# Patient Record
(demographics unavailable — no encounter records)

---

## 2024-10-24 NOTE — ASSESSMENT
[FreeTextEntry1] : # SCCIS, L upper back superior - Excision 10/24/2024 with 4mm margins   Excision Operative Note   Indications:  Alternative therapies were discussed. Given the size, location, and tumor type we decided that excision offers the best option for treatment.   Preoperative diagnosis: SCCIS  Postoperative diagnosis: Same   Location: L upper back superior  Anesthetic: 1% lidocaine with 1:100,000 epinephrine   Antiseptic: Chlorhexidine or Betadine  Attending surgeon: Victor Hugo Cheng MD Assistant(s): Poonam March MD (Dermsurgical resident)  Estimated blood loss: < 5cc    Complications: None  Initial lesion size: 1cm x 1cm   Surgical margin: 0.4cm  Total excision size (always includes surgical margin):  1.8x1.8cm  Closure type: Complex linear closure  Length of closure: 4.5cm  Suture material: 3.0 Vicryl, 3.0 Ethilon    The patient was brought back to the minor surgery operating room. Prior to the procedure the medical record was reviewed by the physician. A pre-procedure checklist in the presence of the patient was reviewed. A surgery " timeout " was observed. The following were confirmed during the surgery timeout: patient name, confirmation of consent, patient position, allergies, type of anesthesia, and antibiotic given (if any, see emr entry for any medications).  The risks of the procedure, including bleeding, infection, nerve damage, possibility of recurrence, failure of the repair, and the certainty of a scar were discussed with the patient. Alternatives to the procedure, as well as their risks and benefits, were also discussed. The patient was offered an opportunity to ask any questions and, after expressing understanding of the proposed procedure and its alternatives, the patient signed the consent form. The patient was placed in appropriate position and the area was prepared and draped in the usual manner. Local anesthesia was obtained with the above mentioned anesthetic. Using a sterile surgical marking pen, an elliptical (or circular) excision was designed around the lesion and along relaxed skin tension lines, if possible, incorporating the margin of normal-appearing skin mentioned above. A full thickness skin incision using a #15 blade Bard-Raheel scalpel was performed and the lesion was excised sharply in the subcutaneous plane.  The specimen was submitted for histopathologic examination.   The defect was moderately undermined in the subcutaneous plane if needed to reduce wound closure tension and allow for easy wound edge eversion.  Hemostasis was maintained with the electrosurgical unit.  Interrupted, inverted, subcuticular buried mattress sutures were placed using the material mentioned above to approximate the dermal edges of the wound. Interrupted and running simple cutaneous sutures were used to further approximate and dennise the wound edges.  The final length of the repair is listed in the summary above. A firm pressure dressing was applied over vaseline ointment and Telfa. Verbal postoperative wound care instructions were given and a wound care hand out was dispensed.  The patient was asked to follow up for a wound check as specified. The patient was also told to call us at anytime for signs of wound infection or any other concerns they might have regarding the surgery or the healing process.    The patient tolerated the procedure well and ambulated from the operatory without problem.   RTC 6 mo for TBSE

## 2024-10-24 NOTE — HISTORY OF PRESENT ILLNESS
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

## 2024-10-24 NOTE — PHYSICAL EXAM
[FreeTextEntry3] : Focused skin exam performed  The relevant portions of the exam were performed today  AAOx3, NAD, well-appearing / pleasant Focused examination within normal limits with the exception of:  - well healed bx site of L upper back - superior 1x1cm

## 2024-12-09 NOTE — PHYSICAL EXAM
[No Acute Distress] : no acute distress [Well Nourished] : well nourished [No Deformities] : no deformities [Oriented x3] : oriented x3 [Normal Affect] : normal affect

## 2024-12-09 NOTE — REVIEW OF SYSTEMS
[Cough] : cough [Wheezing] : wheezing [SOB on Exertion] : sob on exertion [Negative] : Psychiatric [Hemoptysis] : no hemoptysis [Chest Tightness] : no chest tightness [Sputum] : no sputum [Dyspnea] : no dyspnea [Pleuritic Pain] : no pleuritic pain [A.M. Dry Mouth] : no a.m. dry mouth

## 2024-12-09 NOTE — HISTORY OF PRESENT ILLNESS
[Former] : former [Never] : never [Obstructive Sleep Apnea] : obstructive sleep apnea [Awakes Unrefreshed] : awakes unrefreshed [Awakes with Dry Mouth] : awakes with dry mouth [Daytime Somnolence] : daytime somnolence [Difficulty Maintaining Sleep] : difficulty maintaining sleep [Fatigue] : fatigue [Frequent Nocturnal Awakening] : frequent nocturnal awakening [Nonrestorative Sleep] : nonrestorative sleep [Recent  Weight Gain] : recent weight gain [DMS] : difficulty maintaining sleep [Lab] : lab [APAP:] : APAP [Nasal pillow] : nasal pillow [TextBox_4] : This is a 66-year-old female with significant past medical history of [employer] exposure, atrial fibrillation, obstructive sleep apnea on bilevel and former tobacco use who comes in for a follow up.    The patient reports having discontinued CPAP/BiPAP therapy for a week, attempting to use an alternative treatment, but experienced issues and resumed CPAP/BiPAP use. The patient expresses concern over a recent CAT scan that identified a 5 mm nodule, which was not present in the previous year's scan. The patient contacted the NP at the [facility] and insisted on further investigation despite the radiologist's opinion that it was unchanged. The patient has a significant family history of cancer, including lung cancer in the mother and two brothers with stage four cancer, one with lung cancer initially thought to be pancreatic, and the other with colon cancer metastasized to the liver and lung. The patient also has a personal history of bladder cancer, squamous cell carcinoma, and basal cell carcinoma. The patient is vigilant about health monitoring due to this history and is proactive in seeking follow-up and clarification on health issues  Of note: Patient indicates that she was diagnosed with obstructive sleep apnea over 2 years ago and received a CPAP machine and initially derived benefit from it.  She initially got tested for sleep apnea due to new onset atrial fibrillation.  She derive benefit from her CPAP machine by decreasing the frequency of her atrial fibrillation and eliminating her excessive daytime sleepiness.  However, she recently has noticed an increase in her excessive daytime sleepiness and frequent nocturnal awakenings.  She has no history of snoring or witnessed apnea while on the CPAP machine.  She has never had her CPAP followed up or her compliance data followed up to determine that she is benefiting from the machine.  Her [employer] is [employer] and she currently uses an air sense 10 with an APAP of 6 to 12 cm H2O.  She uses a ResMed nasal pillows.  She experienced some weight gain since her onset diagnosis of sleep apnea and experienced menopause several years prior to her diagnosis.  Patient also indicates that she was diagnosed with COVID-19 around 21 days ago.  She continues to have cough, sputum production, and some dyspnea on exertion.  She does admit that she has an exposure history with the [facility] and smoking history as well.  Has never been diagnosed with asthma or COPD.  She is on no inhaler medications.  Her last pulmonary function test did not show any significant abnormalities.  She is concerned given how sick she was with COVID-19 about a month ago and that she has not fully recovered.  She also indicates that she is lost significant amount of weight since the last time we saw each other.  She weighed roughly 230 pounds and currently weighs 217 pounds.  She would like to continue to lose more weight as this will help her overall health and possibly her continued low oxygen at night.  DME: [facility] Of note: There is a family history of sarcoidosis.  [Awakes with Headache] : does not awaken with headache [Difficulty Initiating Sleep] : does not have difficulty initiating sleep [Paresthesias] : denies paresthesias [Snoring] : no snoring [Tired while Driving] : not tired while driving [Unintentional Sleep while Active] : no unintentional sleep while active [Witnessed Apneas] : no witnessed apneas [DIS] : does not have difficulty initiating sleep [Unusual Sleep Behavior] : no unusual sleep behavior [Hypersomnolence] : no hypersomnolence [Cataplexy] : no cataplexy [Sleep Paralysis] : no sleep paralysis [Hypnagogic Hallucinations] : no hypnagogic hallucinations [Hypnopompic Hallucinations] : no hypnopompic hallucinations [Lower Extremity Discomfort] : does not have lower extremity discomfort [Irresistible urge to move legs] : does not have irresistible urge to move legs [LE discomfort relieved by movement] : denies lower extremity discomfort relieved by movement [Late day/ Evening symptoms] : does not have late day/ evening symptoms [Sleep Disturbances due to LE symptoms] : denies sleep disturbances due to lower extremity symptoms [TextBox_77] : 10pm [TextBox_79] : 5;30am [TextBox_89] : 3-4 [TextBox_100] : 11/2020 [TextBox_108] : 25 [TextBox_125] : 6-12 cmH2O [TextBox_127] : 07/3/2022 [TextBox_129] : 08/03/2022 [TextBox_133] : 29/30 Day [TextBox_137] : 98 [TextBox_141] : 8 [TextBox_143] : 20 [TextBox_147] : 1.5 [TextBox_158] : Angel [TextBox_160] : Resmed [ESS] : 7

## 2024-12-09 NOTE — ASSESSMENT
[FreeTextEntry1] : This is a 66-year-old female with significant past medical history of [place] [employer] exposure, obstructive sleep apnea, atrial fibrillation, and former tobacco use who comes in for follow-up.  Assessment: 1. Obstructive Sleep Apnea (PALOMA) with nocturnal hypoventilation: The patient is currently using a BPAP machine for sleep apnea treatment but is dissatisfied due to noise and sleep disruption. She attempted to switch to a dental device but went into atrial fibrillation when she transitioned. Now she is compliant with her Bilevel machine.   2. Lung Nodule: A 5 mm nodule was identified on the patient's recent CAT scan, which is a new finding compared to the previous year. Given the patient's significant family history of cancer, further investigation is warranted.  3. Cancer Risk: The patient has a strong family history of various cancers, including lung cancer, and a personal history of bladder cancer and skin cancers. This history suggests a possible genetic predisposition to malignancies.  Plan: - Continue CPAP/BiPAP therapy as previously prescribed. - Schedule a follow-up CT scan in March to reassess the 5 mm lung nodule. - Encourage a diet rich in antioxidants to potentially reduce cancer risk. - Consider genetic counseling to evaluate the patient's risk for hereditary cancers.  Follow up: The patient will follow up in early April after the CAT scan in March to review the results and discuss further management. Uncooperative

## 2025-07-25 NOTE — HISTORY OF PRESENT ILLNESS
[Former] : former [Never] : never [Obstructive Sleep Apnea] : obstructive sleep apnea [Awakes Unrefreshed] : awakes unrefreshed [Awakes with Dry Mouth] : awakes with dry mouth [Daytime Somnolence] : daytime somnolence [Difficulty Maintaining Sleep] : difficulty maintaining sleep [Fatigue] : fatigue [Frequent Nocturnal Awakening] : frequent nocturnal awakening [Nonrestorative Sleep] : nonrestorative sleep [Recent  Weight Gain] : recent weight gain [DMS] : difficulty maintaining sleep [Lab] : lab [APAP:] : APAP [Nasal pillow] : nasal pillow [TextBox_4] : This is a 67-year-old female with significant past medical history of [place] [employer] exposure, atrial fibrillation, obstructive sleep apnea on bilevel and former tobacco use who comes in for a follow up.    The patient was hospitalized in [place] for mycoplasma pneumonia, which severely impacted her oxygen saturation, dropping to 70%, exacerbating her atrial fibrillation, and resulting in a sepsis diagnosis with elevated troponin levels. After self-administering metoprolol and starting azithromycin, she improved and weaned off supplemental oxygen, with her saturation currently at 98%; now, she is rebuilding her strength through light exercise and addressing a scar under her nose from high-flow oxygen use. Additionally, she had a nose biopsy due to dermatitis concerns, which became inflamed, leading to infection; she plans further treatment in [place] and aims to resume low-dose CT scans monitoring her unchanged lung nodule, scheduling the next scan for September.  Of note: 12/2024 The patient discontinued CPAP/BiPAP therapy for a week to try an alternative treatment but resumed its use due to issues. Concerned about a recent CAT scan revealing a 5 mm nodule absent in the previous year's scan, the patient sought further investigation despite the radiologist's opinion of no change, given a significant family history of cancer, including lung cancer and advanced-stage cancers among immediate family members. With a personal history of bladder, squamous cell, and basal cell carcinoma, the patient is vigilant about health monitoring and proactive in pursuing follow-ups and clarity on medical concerns.  Of note: Patient indicates that she was diagnosed with obstructive sleep apnea over 2 years ago and received a CPAP machine and initially derived benefit from it.  She initially got tested for sleep apnea due to new onset atrial fibrillation.  She derive benefit from her CPAP machine by decreasing the frequency of her atrial fibrillation and eliminating her excessive daytime sleepiness.  However, she recently has noticed an increase in her excessive daytime sleepiness and frequent nocturnal awakenings.  She has no history of snoring or witnessed apnea while on the CPAP machine.  She has never had her CPAP followed up or her compliance data followed up to determine that she is benefiting from the machine.  Her [employer] is [employer] and she currently uses an air sense 10 with an APAP of 6 to 12 cm H2O.  She uses a ResMed nasal pillows.  She experienced some weight gain since her onset diagnosis of sleep apnea and experienced menopause several years prior to her diagnosis.  Patient also indicates that she was diagnosed with COVID-19 around 21 days ago.  She continues to have cough, sputum production, and some dyspnea on exertion.  She does admit that she has an exposure history with the [place] and smoking history as well.  Has never been diagnosed with asthma or COPD.  She is on no inhaler medications.  Her last pulmonary function test did not show any significant abnormalities.  She is concerned given how sick she was with COVID-19 about a month ago and that she has not fully recovered.  She also indicates that she is lost significant amount of weight since the last time we saw each other.  She weighed roughly 230 pounds and currently weighs 217 pounds.  She would like to continue to lose more weight as this will help her overall health and possibly her continued low oxygen at night.  DME: [facility] Of note: There is a family history of sarcoidosis.  [Awakes with Headache] : does not awaken with headache [Difficulty Initiating Sleep] : does not have difficulty initiating sleep [Paresthesias] : denies paresthesias [Snoring] : no snoring [Tired while Driving] : not tired while driving [Unintentional Sleep while Active] : no unintentional sleep while active [Witnessed Apneas] : no witnessed apneas [DIS] : does not have difficulty initiating sleep [Unusual Sleep Behavior] : no unusual sleep behavior [Hypersomnolence] : no hypersomnolence [Cataplexy] : no cataplexy [Sleep Paralysis] : no sleep paralysis [Hypnagogic Hallucinations] : no hypnagogic hallucinations [Hypnopompic Hallucinations] : no hypnopompic hallucinations [Lower Extremity Discomfort] : does not have lower extremity discomfort [Irresistible urge to move legs] : does not have irresistible urge to move legs [LE discomfort relieved by movement] : denies lower extremity discomfort relieved by movement [Late day/ Evening symptoms] : does not have late day/ evening symptoms [Sleep Disturbances due to LE symptoms] : denies sleep disturbances due to lower extremity symptoms [TextBox_77] : 10pm [TextBox_79] : 5;30am [TextBox_89] : 3-4 [TextBox_100] : 11/2020 [TextBox_108] : 25 [TextBox_125] : 6-12 cmH2O [TextBox_127] : 07/3/2022 [TextBox_129] : 08/03/2022 [TextBox_133] : 29/30 Day [TextBox_137] : 98 [TextBox_141] : 8 [TextBox_143] : 20 [TextBox_147] : 1.5 [TextBox_158] : Angel [TextBox_160] : Resmed [ESS] : 7

## 2025-07-25 NOTE — ASSESSMENT
ERROR    [FreeTextEntry1] : This is a 66-year-old female with significant past medical history of [place] exposure, obstructive sleep apnea, atrial fibrillation, and former tobacco use who comes in for follow-up.  Assessment: 1. Obstructive Sleep Apnea (PALOMA) with nocturnal hypoventilation: The patient is currently using a BPAP machine for sleep apnea treatment. She is using it and tolerating it well.   2. Lung Nodule: A 5 mm nodule was identified on the patient's recent CAT scan, which is a new finding compared to the previous year. Repeat again showing a 5mm nodule which has not changed. She can resume yearly low dose lung cancer screenings.   3. Recent Mycoplasma Pneumonia with Complications - Patient reports recent hospitalization for mycoplasma pneumonia in [place]. Condition was severe, with oxygen saturation dropping to 70% and near-intubation. Complications included atrial fibrillation and elevated troponins suggestive of non-STEMI, likely due to demand ischemia from tachycardia and hypoxia. Patient was diagnosed with sepsis and treated with azithromycin, to which she responded rapidly. She was discharged on home oxygen but has since weaned herself off, with current oxygen saturation at 98%.  Plan: - Schedule low-dose CT scan in September for continued monitoring of lung nodules - No active interventions required at this time for recent pneumonia as patient has recovered - Advised patient to contact this office if similar pneumonia symptoms recur in the future - Patient to continue bilevel therapy.  Follow-up: Follow-up in 6 months for reassessment of lung nodules

## 2025-07-25 NOTE — REASON FOR VISIT
[Home] : at home, [unfilled] , at the time of the visit. [Medical Office: ([facility])___] : at the medical office located in  [Telehealth (audio & video)] : This visit was provided via telehealth using real-time 2-way audio visual technology. [Verbal consent obtained from patient] : the patient, [unfilled] [Follow-Up] : a follow-up visit [Sleep Apnea] : sleep apnea